# Patient Record
Sex: MALE | Race: WHITE | Employment: FULL TIME | URBAN - METROPOLITAN AREA
[De-identification: names, ages, dates, MRNs, and addresses within clinical notes are randomized per-mention and may not be internally consistent; named-entity substitution may affect disease eponyms.]

---

## 2020-07-20 ENCOUNTER — HOSPITAL ENCOUNTER (EMERGENCY)
Age: 45
Discharge: HOME | End: 2020-07-20
Payer: COMMERCIAL

## 2020-07-20 VITALS
SYSTOLIC BLOOD PRESSURE: 140 MMHG | HEART RATE: 83 BPM | TEMPERATURE: 98.1 F | BODY MASS INDEX: 24.44 KG/M2 | WEIGHT: 165 LBS | DIASTOLIC BLOOD PRESSURE: 88 MMHG | OXYGEN SATURATION: 97 % | RESPIRATION RATE: 17 BRPM | HEIGHT: 69 IN

## 2020-07-20 PROCEDURE — 6360000002 HC RX W HCPCS: Performed by: EMERGENCY MEDICINE

## 2020-07-20 PROCEDURE — 96372 THER/PROPH/DIAG INJ SC/IM: CPT

## 2020-07-20 PROCEDURE — 99283 EMERGENCY DEPT VISIT LOW MDM: CPT

## 2020-07-20 RX ORDER — KETOROLAC TROMETHAMINE 30 MG/ML
60 INJECTION, SOLUTION INTRAMUSCULAR; INTRAVENOUS ONCE
Status: COMPLETED | OUTPATIENT
Start: 2020-07-20 | End: 2020-07-20

## 2020-07-20 RX ORDER — NAPROXEN 500 MG/1
500 TABLET ORAL 2 TIMES DAILY PRN
Qty: 30 TABLET | Refills: 0 | Status: SHIPPED | OUTPATIENT
Start: 2020-07-20

## 2020-07-20 RX ORDER — METHOCARBAMOL 500 MG/1
500 TABLET, FILM COATED ORAL 4 TIMES DAILY
Qty: 20 TABLET | Refills: 0 | Status: SHIPPED | OUTPATIENT
Start: 2020-07-20

## 2020-07-20 RX ORDER — DEXAMETHASONE SODIUM PHOSPHATE 10 MG/ML
10 INJECTION, SOLUTION INTRAMUSCULAR; INTRAVENOUS ONCE
Status: COMPLETED | OUTPATIENT
Start: 2020-07-20 | End: 2020-07-20

## 2020-07-20 RX ORDER — PREDNISONE 50 MG/1
50 TABLET ORAL DAILY
Qty: 7 TABLET | Refills: 0 | Status: SHIPPED | OUTPATIENT
Start: 2020-07-20 | End: 2020-07-27

## 2020-07-20 RX ADMIN — KETOROLAC TROMETHAMINE 60 MG: 60 INJECTION, SOLUTION INTRAMUSCULAR at 14:04

## 2020-07-20 RX ADMIN — DEXAMETHASONE SODIUM PHOSPHATE 10 MG: 10 INJECTION, SOLUTION INTRAMUSCULAR; INTRAVENOUS at 14:05

## 2020-07-20 ASSESSMENT — PAIN SCALES - GENERAL
PAINLEVEL_OUTOF10: 8
PAINLEVEL_OUTOF10: 8

## 2020-07-20 NOTE — ED PROVIDER NOTES
eMERGENCY dEPARTMENT eNCOUnter      PCP: Toby Franklin MD    CHIEF COMPLAINT    Chief Complaint   Patient presents with    Back Pain     lower; started a week ago; states that he was bowling and feels that he twisted it        HPI    Kota Fatima is a 45 y.o. male who presents with diffuse low back pain. Symptoms began about a week ago while he was bowling and states that he twisted his back. He states it has been persistent since then, worsens with sitting with intermittent radiation into bilateral thighs. Symptoms seem to improve with walking. Has not tried anything at home for relief of symptoms. Patient denies fever, chills, bowel/bladder incontinence, urine retention, saddle anesthesia, lower extremity weakness or altered sensation. Patient denies personal history of diabetes mellitus, spinal abnormalities, recent spinal trauma, IV drug use, or cancer. REVIEW OF SYSTEMS    Constitutional:  Denies fever, chills, weight loss, or weakness. Cardiovascular:  Denies chest pain, palpitations, or swelling  Respiratory:  Denies cough or shortness of breath    GI:  Denies abdominal pain, nausea, vomiting, or diarrhea  :  Denies any urinary symptoms    Musculoskeletal:  See HPI above   Skin:  Denies rash  Neurologic:   See HPI. No bowel incontinence or bladder retention, no saddle anesthesia. + radicular symptoms. No lower extremity weakness or altered sensation. Endocrine:  Denies polyuria or polydypsia   Lymphatic:  Denies swollen glands     All other review of systems are negative  See HPI and nursing notes for additional information       PAST MEDICAL & SURGICAL HISTORY    History reviewed. No pertinent past medical history. History reviewed. No pertinent surgical history.     CURRENT MEDICATIONS    Current Outpatient Rx   Medication Sig Dispense Refill    naproxen (NAPROSYN) 500 MG tablet Take 1 tablet by mouth 2 times daily as needed for Pain 30 tablet 0    methocarbamol (ROBAXIN) 500 MG tablet Take 1 tablet by mouth 4 times daily As needed for muscle spasm. 20 tablet 0    predniSONE (DELTASONE) 50 MG tablet Take 1 tablet by mouth daily for 7 days 7 tablet 0    famotidine (PEPCID) 20 MG tablet Take 1 tablet by mouth 2 times daily for 15 days. 30 tablet 0    diphenhydrAMINE (BENADRYL) 50 MG capsule Take 1 capsule by mouth every 6 hours as needed for Itching or Allergies for 40 doses.  40 capsule 0       ALLERGIES    Allergies   Allergen Reactions    Wasp Venom        SOCIAL HISTORY    Social History     Socioeconomic History    Marital status:      Spouse name: None    Number of children: None    Years of education: None    Highest education level: None   Occupational History    None   Social Needs    Financial resource strain: None    Food insecurity     Worry: None     Inability: None    Transportation needs     Medical: None     Non-medical: None   Tobacco Use    Smoking status: Current Every Day Smoker     Packs/day: 0.50    Smokeless tobacco: Never Used   Substance and Sexual Activity    Alcohol use: Yes     Comment: occasionally    Drug use: No    Sexual activity: None   Lifestyle    Physical activity     Days per week: None     Minutes per session: None    Stress: None   Relationships    Social connections     Talks on phone: None     Gets together: None     Attends Denominational service: None     Active member of club or organization: None     Attends meetings of clubs or organizations: None     Relationship status: None    Intimate partner violence     Fear of current or ex partner: None     Emotionally abused: None     Physically abused: None     Forced sexual activity: None   Other Topics Concern    None   Social History Narrative    None       PHYSICAL EXAM    VITAL SIGNS: BP (!) 140/88 Comment: patient moving to show [facility] where the pain is  Pulse 83   Temp 98.1 °F (36.7 °C) (Oral)   Resp 17   Ht 5' 9\" (1.753 m)   Wt 165 lb (74.8 kg)   SpO2 97% BMI 24.37 kg/m²    Patient was noted to be afebrile    Constitutional:  Well developed, well nourished. HENT:  Atraumatic, moist mucus membranes. Eyes:  No orbital trauma. No scleral icterus. Neck: No JVD, supple. No enlarged lymph nodes. Cardiovascular:  Normal rate, regular rhythm, no murmurs/rubs/gallops  Respiratory:  No respiratory distress, normal breath sounds. Abdomen: Bowel sounds normal, abdomen soft, no tenderness, no masses. Musculoskeletal:  No edema, no deformities. Back:   - No gross deformity, swelling, or discoloration.    - + bilateral Paralumbar tenderness without masses, fluctuance, warmth, or skin changes.  - No localized midline bony tenderness.   - No change in pain with forward flexion.  - SLR test negative. No CVA tenderness to percussion    Integument:  Well hydrated, no rash, no pallor. Neurologic:    Intact sensation lower leg, including nl sensation to light touch inner thigh, distal legs, foot, perineal/perianal sensation,  Sensation inner upper thigh normal  5/5 strength adduction thigh, flex/extension knee, foot dorsiflexion/extension, toe extension/curling toes. 2+ patellar reflexes catarina   Vascular:  Distal pulses and capillary refill intact in bilateral lower extremities          ED COURSE & MEDICAL DECISION MAKING       Vital signs and nursing notes reviewed during ED course. I have independently evaluated this patient. Supervising MD present in the Emergency Department, available for consultation, throughout entirety of patient care. History and exam is consistent with musculoskeletal back pain. He is hemodynamically stable, afebrile, not tachycardic with intact neurological exam.  No red flag symptoms concerning for cauda equina, epidural abscess, rapidly expanding AAA or other emergent etiology. While in the ED today, patient was provided with symptomatic treatment with Toradol and dexamethasone.   At bedside today, I discussed stretching exercises and advised patient to avoid vigorous activity. I recommended supportive OTC back brace for the next several days. Patient was discharged with prescriptions for naproxen, Robaxin and prednisone. Proper use and potential side effects of medications discussed. All pertinent lab data and radiographic results reviewed with patient at bedside. The patient and/or the family were informed of the results of any tests/labs/imaging, the treatment plan, and time was allotted to answer questions. I estimate there is LOW risk for ABDOMINAL AORTIC ANEURYSM, CAUDA EQUINA SYNDROME, EPIDURAL MASS LESION, SPINAL STENOSIS, OR HERNIATED DISK CAUSING SEVERE STENOSIS, thus I consider the discharge disposition reasonable. Varinder Del Angel and I have discussed the diagnosis and risks, and we agree with discharging home to follow-up with their primary doctor. We also discussed returning to the Emergency Department immediately if new or worsening symptoms occur. We have discussed the symptoms which are most concerning (e.g., saddle anesthesia, urinary or bowel incontinence or retention, changing or worsening pain) that necessitate immediate return. Clinical  IMPRESSION    1.  Strain of lumbar region, initial encounter          Disposition referral (if applicable):  Zaira Ames MD  [address] [facility]  [phone]    Schedule an appointment as soon as possible for a visit in 2 days  For recheck of symptoms treated for today    Leah Hale MD  [facility]  [phone]    Schedule an appointment as soon as possible for a visit in 2 days  for spine specialist follow up    [facility] Emergency Department  [address] [id number]  [phone]  Go to   As needed, If symptoms worsen      Disposition medications (if applicable):  New Prescriptions    METHOCARBAMOL (ROBAXIN) 500 MG TABLET    Take 1 tablet by mouth 4 times daily As needed for muscle spasm. NAPROXEN (NAPROSYN) 500 MG TABLET    Take 1 tablet by mouth 2 times daily as needed for Pain    PREDNISONE (DELTASONE) 50 MG TABLET    Take 1 tablet by mouth daily for 7 days         Comment: Please note this report has been produced using speech recognition software and may contain errors related to that system including errors in grammar, punctuation, and spelling, as well as words and phrases that may be inappropriate. If there are any questions or concerns please feel free to contact the dictating provider for clarification.         AVERY Walsh  07/20/20 9094